# Patient Record
Sex: FEMALE | Race: WHITE | NOT HISPANIC OR LATINO | URBAN - METROPOLITAN AREA
[De-identification: names, ages, dates, MRNs, and addresses within clinical notes are randomized per-mention and may not be internally consistent; named-entity substitution may affect disease eponyms.]

---

## 2022-08-07 ENCOUNTER — EMERGENCY (EMERGENCY)
Facility: HOSPITAL | Age: 42
LOS: 0 days | Discharge: HOME | End: 2022-08-07
Attending: EMERGENCY MEDICINE | Admitting: EMERGENCY MEDICINE

## 2022-08-07 VITALS
HEART RATE: 90 BPM | SYSTOLIC BLOOD PRESSURE: 124 MMHG | DIASTOLIC BLOOD PRESSURE: 84 MMHG | RESPIRATION RATE: 20 BRPM | TEMPERATURE: 98 F | WEIGHT: 115.08 LBS | OXYGEN SATURATION: 99 %

## 2022-08-07 VITALS
SYSTOLIC BLOOD PRESSURE: 122 MMHG | DIASTOLIC BLOOD PRESSURE: 60 MMHG | RESPIRATION RATE: 20 BRPM | OXYGEN SATURATION: 100 % | HEART RATE: 81 BPM

## 2022-08-07 DIAGNOSIS — R14.0 ABDOMINAL DISTENSION (GASEOUS): ICD-10-CM

## 2022-08-07 DIAGNOSIS — K59.00 CONSTIPATION, UNSPECIFIED: ICD-10-CM

## 2022-08-07 PROCEDURE — 99284 EMERGENCY DEPT VISIT MOD MDM: CPT

## 2022-08-07 NOTE — ED PROVIDER NOTE - NS ED ROS FT
CONST: No fever, chills or bodyaches  EYES: No pain, redness, drainage or visual changes.  ENT: No ear pain or discharge, nasal discharge or congestion. No sore throat  CARD: No chest pain, palpitations  RESP: No SOB, cough, hemoptysis. No hx of asthma or COPD  GI: No abdominal pain, N/V/D. (+) constipation  : No urinary symptoms  MS: No joint pain, back pain or extremity pain/injury  SKIN: No rashes  NEURO: No headache, dizziness, paresthesias or LOC

## 2022-08-07 NOTE — ED PROVIDER NOTE - PATIENT PORTAL LINK FT
You can access the FollowMyHealth Patient Portal offered by [facility] by registering at the following website: [email or web address]. By joining [facility]’s FollowMyHealth portal, you will also be able to view your health information using other applications (apps) compatible with our system.

## 2022-08-07 NOTE — ED PROVIDER NOTE - ATTENDING APP SHARED VISIT CONTRIBUTION OF CARE
41 yo F presents for evaluation of difficulty having BM for the last 5 days.  Pt with rectal pressure, states having liquid stools.  Pt states she tried laxative with little relief, no fever or chills, no n/v. On exam pt in NAD AAO x 3, abd soft nt nd, no edema, + hard stool in rectum, disimpacted.

## 2022-08-07 NOTE — ED ADULT TRIAGE NOTE - BRAND OF COVID-19 VACCINATION
Lab Results   Component Value Date    WBC 6.05 08/28/2020    HGB 9.5 (L) 08/28/2020    HCT 28.2 (L) 08/28/2020    MCV 94.0 08/28/2020     08/28/2020     Procrit given per protocol.  Pt denies complaints.  Next appt reviewed and pt knows to call sooner with concerns.    Pfizer dose 1 and 2

## 2022-08-07 NOTE — ED PROVIDER NOTE - CARE PROVIDER_API CALL
Jaya Archer)  Gastroenterology; Internal Medicine  [address] [place] [address]  Phone: [phone]  Fax: [phone]  Follow Up Time: Routine

## 2022-08-07 NOTE — ED PROVIDER NOTE - PHYSICAL EXAMINATION
Physical Exam    Vital Signs: I have reviewed the initial vital signs.  Constitutional: well-nourished, appears stated age, no acute distress  Eyes: Conjunctiva pink, Sclera clear  Cardiovascular: S1 and S2, regular rate, regular rhythm, well-perfused extremities, radial pulses equal and 2+ b/l.   Respiratory: unlabored respiratory effort, clear to auscultation bilaterally no wheezing, rales and rhonchi. pt is speaking full sentences. no accessory muscle use.   Gastrointestinal: soft, non-tender, nondistended abdomen, no pulsatile mass, normal bowl sounds, no rebound, no guarding  Rectal: exam chaperoned by Dr. Henry. no hemorrhoids. no palpable masses. no anal fissures. (+) hard stool from rectum removed. no brbpr. no melena.   Musculoskeletal: FROM of b/l upper and lower extremities.   Integumentary: warm, dry, no rash  Neurologic: awake, alert, cranial nerves II-XII grossly intact, extremities’ motor and sensory functions grossly intact. steady gait.   Psychiatric: appropriate mood, appropriate affect

## 2022-08-07 NOTE — ED PROVIDER NOTE - PROGRESS NOTE DETAILS
FF: pt was disimpact, had stool near the rectum removed. pt had large bowel movement in the ED. pt reports she is feeling better. abdomen is soft and nontender. agreeable to dc. f/u with gi. advised of return precautions. FF: pt was manually disimpacted at bedside, had stool near the rectum removed. pt had large bowel movement in the ED. pt reports she is feeling better. abdomen is soft and nontender. agreeable to dc. f/u with gi. advised of return precautions.

## 2022-08-07 NOTE — ED ADULT NURSE NOTE - NSIMPLEMENTINTERV_GEN_ALL_ED
Implemented All Universal Safety Interventions:  [place] to call system. Call bell, personal items and telephone within reach. Instruct patient to call for assistance. Room bathroom lighting operational. Non-slip footwear when patient is off stretcher. Physically safe environment: no spills, clutter or unnecessary equipment. Stretcher in lowest position, wheels locked, appropriate side rails in place.

## 2022-08-07 NOTE — ED PROVIDER NOTE - OBJECTIVE STATEMENT
41 y/o female with no significant PMH presents to the ED for evaluation of constipation x 5 days. pt reports she tried to take laxatives without relief. pt now feels her stomach has become bloated. pt denies hx of abdominal surgeries, fever, chills, back pain, nausea, vomiting, weakness, or rashes.

## 2022-08-07 NOTE — ED PROVIDER NOTE - NSTIMEPROVIDERCAREINITIATE_GEN_ER
I will complete the form. However, I need to know when she last had a fever or chills. She should be out of work from 10 days since her symptoms STARTED (which was 12/2), or since it has been 3 days with no fever without the use of tylenol or ibuprofen, AND if she feel she is improving.     I tried calling Priya twice and left a voicemail with this request. If she tries to call back, please ask her this information and let me know, or transfer the call to me.     Ruby Wagoner MD     07-Aug-2022 18:28

## 2022-08-07 NOTE — ED PROVIDER NOTE - CLINICAL SUMMARY MEDICAL DECISION MAKING FREE TEXT BOX
Fecal disimpaction performed.  Patient reports feeling improved after that and was able to have a bowel movement today.  Patient asking to go home.  Patient to follow-up with PMD and GI. patient is to return for any worsening symptoms or concerns